# Patient Record
Sex: FEMALE | Race: WHITE | URBAN - METROPOLITAN AREA
[De-identification: names, ages, dates, MRNs, and addresses within clinical notes are randomized per-mention and may not be internally consistent; named-entity substitution may affect disease eponyms.]

---

## 2018-10-31 ENCOUNTER — APPOINTMENT (OUTPATIENT)
Dept: URBAN - METROPOLITAN AREA SURGERY 9 | Age: 63
Setting detail: DERMATOLOGY
End: 2018-10-31

## 2018-10-31 VITALS — DIASTOLIC BLOOD PRESSURE: 82 MMHG | HEART RATE: 62 BPM | RESPIRATION RATE: 14 BRPM | SYSTOLIC BLOOD PRESSURE: 124 MMHG

## 2018-10-31 PROBLEM — C44.01 BASAL CELL CARCINOMA OF SKIN OF LIP: Status: ACTIVE | Noted: 2018-10-31

## 2018-10-31 PROBLEM — Z85.828 PERSONAL HISTORY OF OTHER MALIGNANT NEOPLASM OF SKIN: Status: ACTIVE | Noted: 2018-10-31

## 2018-10-31 PROCEDURE — OTHER PRESCRIPTION: OTHER

## 2018-10-31 PROCEDURE — 13152 CMPLX RPR E/N/E/L 2.6-7.5 CM: CPT

## 2018-10-31 PROCEDURE — OTHER MOHS SURGERY: OTHER

## 2018-10-31 PROCEDURE — OTHER CONSULTATION FOR MOHS SURGERY: OTHER

## 2018-10-31 PROCEDURE — 17311 MOHS 1 STAGE H/N/HF/G: CPT

## 2018-10-31 PROCEDURE — OTHER RETURN TO REFERRING PROVIDER: OTHER

## 2018-10-31 RX ORDER — AMOXICILLIN 500 MG/1
TABLET, FILM COATED ORAL
Qty: 10 | Refills: 0 | Status: ERX | COMMUNITY
Start: 2018-10-31

## 2018-10-31 NOTE — HPI: MOHS SURGERY CONSULTATION
Has The Cancer Been Biopsied Before?: has been previously biopsied
Who Is Your Referring Provider?: Dr. Bull
When Was Your Biopsy?: 10/17/18

## 2018-10-31 NOTE — PROCEDURE: CONSULTATION FOR MOHS SURGERY
Body Location Override (Optional - Billing Will Still Be Based On Selected Body Map Location If Applicable): left upper lip
X Size Of Lesion In Cm (Optional): 0
Incorporate Mauc In Note: Yes
Detail Level: Detailed
Name Of The Referring Provider For Procedure: Dr. Snow
Anatomic Location From Referring Provider: left upper cutaneous lip

## 2018-10-31 NOTE — PROCEDURE: MOHS SURGERY
Post-Care Instructions: Flap taped wound care education and instruction sheet provided. Patient will follow up with Dr. Bull for suture removal in seven days.

## 2022-10-18 ENCOUNTER — APPOINTMENT (OUTPATIENT)
Dept: URBAN - NONMETROPOLITAN AREA CLINIC 39 | Age: 67
Setting detail: DERMATOLOGY
End: 2022-10-19

## 2022-10-18 PROBLEM — D04.39 CARCINOMA IN SITU OF SKIN OF OTHER PARTS OF FACE: Status: ACTIVE | Noted: 2022-10-18

## 2022-10-18 PROCEDURE — OTHER PRESCRIPTION: OTHER

## 2022-10-18 PROCEDURE — OTHER CONSULTATION FOR MOHS SURGERY: OTHER

## 2022-10-18 PROCEDURE — 17311 MOHS 1 STAGE H/N/HF/G: CPT

## 2022-10-18 PROCEDURE — OTHER RETURN TO REFERRING PROVIDER: OTHER

## 2022-10-18 PROCEDURE — OTHER OTHER: OTHER

## 2022-10-18 PROCEDURE — OTHER MOHS SURGERY: OTHER

## 2022-10-18 RX ORDER — MUPIROCIN 20 MG/G
OINTMENT TOPICAL
Qty: 22 | Refills: 1 | Status: ERX | COMMUNITY
Start: 2022-10-18

## 2022-10-18 NOTE — PROCEDURE: CONSULTATION FOR MOHS SURGERY
Detail Level: Detailed
X Size Of Lesion In Cm (Optional): 0
Anatomic Location From Referring Provider: dorsum of nose
Name Of The Referring Provider For Procedure: Dr. Bull
Incorporate Mauc In Note: Yes

## 2022-10-18 NOTE — PROCEDURE: OTHER
Other (Free Text): Localized dermabrasion performed to buff down the edges of defect.
Note Text (......Xxx Chief Complaint.): This diagnosis correlates with the
Detail Level: Detailed
Render Risk Assessment In Note?: no

## 2022-10-18 NOTE — HPI: MOHS SURGERY CONSULTATION
Has The Cancer Been Biopsied Before?: has been previously biopsied
Who Is Your Referring Provider?: Dr. Bull
When Was Your Biopsy?: 09/01/2022

## 2022-10-18 NOTE — PROCEDURE: MOHS SURGERY
During review of chart it was noticed that patient has had some elevated blood pressures and was not taking his blood pressure medication regularly. Is patient medically cleared to proceed with procedure. Please review and advise?   Graft Cartilage Fenestration Text: The cartilage was fenestrated with a 2mm punch biopsy to help facilitate graft survival and healing.

## 2022-10-18 NOTE — PROCEDURE: MOHS SURGERY
[Negative] : Endocrine [Shortness Of Breath] : no shortness of breath [Easy Bleeding] : no tendency for easy bleeding [Easy Bruising] : no tendency for easy bruising [FreeTextEntry7] : daily BM. Occ blood on toilet tissue. No Repair - Repaired With Adjacent Surgical Defect Text (Leave Blank If You Do Not Want): After obtaining clear surgical margins the defect was repaired concurrently with another surgical defect which was in close approximation.

## 2022-10-18 NOTE — PROCEDURE: MOHS SURGERY
WDL Double O-Z Flap Text: The defect edges were debeveled with a #15 scalpel blade.  Given the location of the defect, shape of the defect and the proximity to free margins a Double O-Z flap was deemed most appropriate.  Using a sterile surgical marker, an appropriate transposition flap was drawn incorporating the defect and placing the expected incisions within the relaxed skin tension lines where possible. The area thus outlined was incised deep to adipose tissue with a #15 scalpel blade.  The skin margins were undermined to an appropriate distance in all directions utilizing iris scissors.

## 2022-10-18 NOTE — PROCEDURE: MOHS SURGERY
Scc Well Differentiated Histology Text: Arising from the epidermis is a keratin-producing proliferation of atypical keratinocytes with invasion into the underlying dermis. Mitoses and atypical forms are evident. Intercellular bridge and keratin héctor formation are evident.
